# Patient Record
Sex: FEMALE | URBAN - METROPOLITAN AREA
[De-identification: names, ages, dates, MRNs, and addresses within clinical notes are randomized per-mention and may not be internally consistent; named-entity substitution may affect disease eponyms.]

---

## 2018-08-21 ENCOUNTER — OFFICE VISIT (OUTPATIENT)
Dept: URBAN - METROPOLITAN AREA CLINIC 23 | Facility: CLINIC | Age: 78
End: 2018-08-21
Payer: COMMERCIAL

## 2018-08-21 DIAGNOSIS — B02.9 ZOSTER W/O COMPLICATION: Primary | ICD-10-CM

## 2018-08-21 DIAGNOSIS — H25.813 COMBINED FORMS OF AGE-RELATED CATARACT, BILATERAL: ICD-10-CM

## 2018-08-21 PROCEDURE — 92012 INTRM OPH EXAM EST PATIENT: CPT | Performed by: OPTOMETRIST

## 2018-08-21 ASSESSMENT — INTRAOCULAR PRESSURE
OD: 12
OS: 12

## 2018-08-21 NOTE — IMPRESSION/PLAN
Impression: Zoster w/o complication: O93.1. Plan: Discussed condition with pt. Advised her to f/u with PCP. Told pt to wait about a month before getting shingles shot. Recommended  also get the shot.

## 2018-08-21 NOTE — IMPRESSION/PLAN
Impression: Combined forms of age-related cataract, bilateral: H25.813. Plan: Cataracts account for the patient's complaints. Patient understands changing glasses will not improve vision. Patient desires to have surgery, recommend surgical consult. Briefly discussed premium IOL. Pt may be good candidate for multifocal lens.

## 2018-09-25 ENCOUNTER — OFFICE VISIT (OUTPATIENT)
Dept: URBAN - METROPOLITAN AREA CLINIC 23 | Facility: CLINIC | Age: 78
End: 2018-09-25
Payer: COMMERCIAL

## 2018-09-25 PROCEDURE — 99214 OFFICE O/P EST MOD 30 MIN: CPT | Performed by: OPHTHALMOLOGY

## 2018-09-25 RX ORDER — PREDNISOLONE ACETATE 10 MG/ML
1 % SUSPENSION/ DROPS OPHTHALMIC
Qty: 1 | Refills: 1 | Status: INACTIVE
Start: 2018-09-25 | End: 2020-01-15

## 2018-09-25 RX ORDER — OFLOXACIN 3 MG/ML
0.3 % SOLUTION/ DROPS OPHTHALMIC
Qty: 1 | Refills: 1 | Status: INACTIVE
Start: 2018-09-25 | End: 2020-01-15

## 2018-09-25 ASSESSMENT — VISUAL ACUITY
OD: 20/40
OS: 20/25

## 2018-09-25 ASSESSMENT — INTRAOCULAR PRESSURE
OD: 13
OS: 12

## 2018-09-25 NOTE — IMPRESSION/PLAN
Impression: Combined forms of age-related cataract, bilateral: H25.813. Condition: established, worsening. Symptoms: may improve with surgery. Vision: vision affected. OD>OS Plan: Cataract accounts for patient's complaints. Reviewed risks, benefits, and procedure. Patient desires surgery, schedule ce/iol OD, RL2, discussed lens options, distance refractive target, patient is clear for surgery in [place] [address].

## 2018-10-02 ENCOUNTER — PRE-OPERATIVE VISIT (OUTPATIENT)
Dept: URBAN - METROPOLITAN AREA CLINIC 23 | Facility: CLINIC | Age: 78
End: 2018-10-02
Payer: COMMERCIAL

## 2018-10-02 PROCEDURE — 92025 CPTRIZED CORNEAL TOPOGRAPHY: CPT | Performed by: OPHTHALMOLOGY

## 2018-10-02 PROCEDURE — 92136 OPHTHALMIC BIOMETRY: CPT | Performed by: OPHTHALMOLOGY

## 2018-10-02 ASSESSMENT — PACHYMETRY
OS: 21.92
OD: 21.98
OS: 2.74
OD: 2.71

## 2018-10-19 ENCOUNTER — SURGERY (OUTPATIENT)
Dept: URBAN - METROPOLITAN AREA SURGERY 11 | Facility: SURGERY | Age: 78
End: 2018-10-19
Payer: COMMERCIAL

## 2018-10-19 PROCEDURE — 66984 XCAPSL CTRC RMVL W/O ECP: CPT | Performed by: OPHTHALMOLOGY

## 2018-10-20 ENCOUNTER — POST-OPERATIVE VISIT (OUTPATIENT)
Dept: URBAN - METROPOLITAN AREA CLINIC 23 | Facility: CLINIC | Age: 78
End: 2018-10-20

## 2018-10-20 ASSESSMENT — INTRAOCULAR PRESSURE
OD: 21
OS: 13

## 2018-10-25 ENCOUNTER — POST-OPERATIVE VISIT (OUTPATIENT)
Dept: URBAN - METROPOLITAN AREA CLINIC 23 | Facility: CLINIC | Age: 78
End: 2018-10-25

## 2018-10-25 PROCEDURE — 99024 POSTOP FOLLOW-UP VISIT: CPT | Performed by: OPTOMETRIST

## 2018-10-25 ASSESSMENT — INTRAOCULAR PRESSURE
OS: 14
OD: 10

## 2018-10-25 ASSESSMENT — VISUAL ACUITY
OS: 20/25
OD: 20/25

## 2018-11-13 ENCOUNTER — POST-OPERATIVE VISIT (OUTPATIENT)
Dept: URBAN - METROPOLITAN AREA CLINIC 23 | Facility: CLINIC | Age: 78
End: 2018-11-13

## 2018-11-13 DIAGNOSIS — Z09 ENCNTR FOR F/U EXAM AFT TRTMT FOR COND OTH THAN MALIG NEOPLM: Primary | ICD-10-CM

## 2018-11-13 ASSESSMENT — INTRAOCULAR PRESSURE
OS: 12
OD: 12

## 2018-11-28 ENCOUNTER — OFFICE VISIT (OUTPATIENT)
Dept: URBAN - METROPOLITAN AREA CLINIC 23 | Facility: CLINIC | Age: 78
End: 2018-11-28
Payer: COMMERCIAL

## 2018-11-28 DIAGNOSIS — H52.4 PRESBYOPIA: Primary | ICD-10-CM

## 2018-11-28 PROCEDURE — 92015 DETERMINE REFRACTIVE STATE: CPT | Performed by: OPTOMETRIST

## 2018-11-28 PROCEDURE — 92012 INTRM OPH EXAM EST PATIENT: CPT | Performed by: OPTOMETRIST

## 2018-11-28 ASSESSMENT — INTRAOCULAR PRESSURE
OD: 12
OS: 12

## 2018-11-28 ASSESSMENT — VISUAL ACUITY
OD: 20/20
OS: 20/25

## 2020-01-15 ENCOUNTER — OFFICE VISIT (OUTPATIENT)
Dept: URBAN - METROPOLITAN AREA CLINIC 23 | Facility: CLINIC | Age: 80
End: 2020-01-15
Payer: COMMERCIAL

## 2020-01-15 PROCEDURE — 92134 CPTRZ OPH DX IMG PST SGM RTA: CPT | Performed by: OPTOMETRIST

## 2020-01-15 PROCEDURE — 92014 COMPRE OPH EXAM EST PT 1/>: CPT | Performed by: OPTOMETRIST

## 2020-01-15 ASSESSMENT — KERATOMETRY
OS: 45.75
OD: 46.13

## 2020-01-15 ASSESSMENT — INTRAOCULAR PRESSURE
OS: 12
OD: 12

## 2020-01-15 NOTE — IMPRESSION/PLAN
Impression: Type 2 diabetes mellitus without complications: K92.1. Plan: Discussed diagnosis in detail with patient. Advised and emphasized patient of blood sugar control. Discussed risks of progression. Poor compliance can lead to blindness. OCT macula performed and reviewed with patient today- no signs of diabetic retinopathy. Reassured patient of condition and treatment. Will continue to observe condition and or symptoms.

## 2020-01-15 NOTE — IMPRESSION/PLAN
Impression: Combined forms of age-related cataract, left eye: H25.812. Plan: Discussed diagnosis in detail with patient. Cataracts affecting vision some, but no surgery is currently recommended. The patient will monitor vision changes and contact us with any decrease in vision. Continue to monitor.

## 2020-07-15 ENCOUNTER — OFFICE VISIT (OUTPATIENT)
Dept: URBAN - METROPOLITAN AREA CLINIC 23 | Facility: CLINIC | Age: 80
End: 2020-07-15
Payer: COMMERCIAL

## 2020-07-15 DIAGNOSIS — D36.9 BENIGN NEOPLASM, UNSPECIFIED SITE: ICD-10-CM

## 2020-07-15 DIAGNOSIS — H16.9 KERATITIS: ICD-10-CM

## 2020-07-15 DIAGNOSIS — H25.812 COMBINED FORMS OF AGE-RELATED CATARACT, LEFT EYE: ICD-10-CM

## 2020-07-15 PROCEDURE — 92014 COMPRE OPH EXAM EST PT 1/>: CPT | Performed by: OPTOMETRIST

## 2020-07-15 ASSESSMENT — VISUAL ACUITY
OS: 20/25
OD: 20/20

## 2020-07-15 ASSESSMENT — INTRAOCULAR PRESSURE
OD: 14
OS: 14

## 2020-07-15 ASSESSMENT — KERATOMETRY
OD: 46.38
OS: 46.25

## 2020-07-15 NOTE — IMPRESSION/PLAN
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 Plan: Discussed diagnosis in detail with patient. Discussed treatment options with patient. Reassured patient of current condition and treatment. Will continue to observe condition and or symptoms.  Consult recommended [[facility]] consult for cyst in canthus OD

## 2020-07-15 NOTE — IMPRESSION/PLAN
Impression: Type 2 diabetes mellitus without complications: B07.8. Plan: Discussed diagnosis in detail with patient. Advised and emphasized patient of blood sugar control. Discussed risks of progression. Poor compliance can lead to blindness. No signs of diabetic retinopathy. Reassured patient of condition and treatment. Will continue to observe condition and or symptoms.

## 2021-09-02 ENCOUNTER — OFFICE VISIT (OUTPATIENT)
Dept: URBAN - METROPOLITAN AREA CLINIC 23 | Facility: CLINIC | Age: 81
End: 2021-09-02
Payer: COMMERCIAL

## 2021-09-02 DIAGNOSIS — E11.9 TYPE 2 DIABETES MELLITUS W/O COMPLICATION: Primary | ICD-10-CM

## 2021-09-02 PROCEDURE — 99214 OFFICE O/P EST MOD 30 MIN: CPT | Performed by: OPTOMETRIST

## 2021-09-02 ASSESSMENT — VISUAL ACUITY
OS: 20/40
OD: 20/20

## 2021-09-02 ASSESSMENT — KERATOMETRY
OD: 45.63
OS: 45.88

## 2021-09-02 ASSESSMENT — INTRAOCULAR PRESSURE
OS: 10
OD: 12

## 2021-09-02 NOTE — IMPRESSION/PLAN
Impression: Combined forms of age-related cataract, left eye: H25.812. Plan: Discussed findings with pt. Pt desires to proceed with cataract surgery. Recommend getting distance standard lens, pt agrees.

## 2021-09-02 NOTE — IMPRESSION/PLAN
Impression: Type 2 diabetes mellitus w/o complication: Y55.6. Plan: Ordered and reviewed special tests and/or Optos and/or OCT and/or Visual Field. Optos shows no background diabetic retinopathy. Pt will continue to watch bloos sugar levels.

## 2021-09-03 ENCOUNTER — OFFICE VISIT (OUTPATIENT)
Dept: URBAN - METROPOLITAN AREA CLINIC 23 | Facility: CLINIC | Age: 81
End: 2021-09-03
Payer: COMMERCIAL

## 2021-09-03 PROCEDURE — 99214 OFFICE O/P EST MOD 30 MIN: CPT | Performed by: OPHTHALMOLOGY

## 2021-09-03 ASSESSMENT — VISUAL ACUITY
OD: 20/20
OS: 20/40

## 2021-09-03 ASSESSMENT — INTRAOCULAR PRESSURE
OS: 9
OD: 9

## 2021-09-03 NOTE — IMPRESSION/PLAN
Impression: Combined forms of age-related cataract, left eye: H25.812. Condition: established, worsening. Symptoms: could improve with surgery. Plan: Cataract accounts for patient's complaints. Reviewed risks, benefits, and procedure. Patient desires surgery, schedule ce/iol OS, RL2, standard lens, distance refractive target, patient is clear for surgery in [facility].

## 2021-10-08 ENCOUNTER — TESTING ONLY (OUTPATIENT)
Dept: URBAN - METROPOLITAN AREA CLINIC 23 | Facility: CLINIC | Age: 81
End: 2021-10-08
Payer: COMMERCIAL

## 2021-10-08 ASSESSMENT — PACHYMETRY
OS: 2.69
OD: 4.22
OS: 21.93
OD: 21.97

## 2021-10-19 ENCOUNTER — SURGERY (OUTPATIENT)
Dept: URBAN - METROPOLITAN AREA SURGERY 11 | Facility: SURGERY | Age: 81
End: 2021-10-19
Payer: COMMERCIAL

## 2021-10-19 PROCEDURE — 66984 XCAPSL CTRC RMVL W/O ECP: CPT | Performed by: OPHTHALMOLOGY

## 2021-10-20 ENCOUNTER — POST-OPERATIVE VISIT (OUTPATIENT)
Dept: URBAN - METROPOLITAN AREA CLINIC 23 | Facility: CLINIC | Age: 81
End: 2021-10-20

## 2021-10-20 PROCEDURE — 99024 POSTOP FOLLOW-UP VISIT: CPT | Performed by: OPTOMETRIST

## 2021-10-20 ASSESSMENT — INTRAOCULAR PRESSURE
OS: 18
OD: 10

## 2021-10-20 NOTE — IMPRESSION/PLAN
Impression: S/P Phaco - PC IOL SA60WF +24.00 OS - 1 Day. Presence of intraocular lens  Z96.1. Post operative instructions reviewed - Plan: Return as scheduled. Sooner if sudden vision change or increasing pain. Discussed pt. may see floater/Dexycu. --Advised patient to use artificial tears for comfort.

## 2021-10-27 ENCOUNTER — POST-OPERATIVE VISIT (OUTPATIENT)
Dept: URBAN - METROPOLITAN AREA CLINIC 23 | Facility: CLINIC | Age: 81
End: 2021-10-27

## 2021-10-27 DIAGNOSIS — Z96.1 PRESENCE OF INTRAOCULAR LENS: Primary | ICD-10-CM

## 2021-10-27 PROCEDURE — 99024 POSTOP FOLLOW-UP VISIT: CPT | Performed by: OPTOMETRIST

## 2021-10-27 NOTE — IMPRESSION/PLAN
Impression: S/P Phaco - PC IOL SA60WF +24.00 OS - 8 Days. Presence of intraocular lens  Z96.1. Condition is improving - Plan: --Advised patient to use artificial tears for comfort.

## 2021-12-01 ENCOUNTER — OFFICE VISIT (OUTPATIENT)
Dept: URBAN - METROPOLITAN AREA CLINIC 23 | Facility: CLINIC | Age: 81
End: 2021-12-01
Payer: COMMERCIAL

## 2021-12-01 PROCEDURE — 92012 INTRM OPH EXAM EST PATIENT: CPT | Performed by: OPTOMETRIST

## 2021-12-01 ASSESSMENT — INTRAOCULAR PRESSURE
OS: 6
OD: 11

## 2021-12-01 ASSESSMENT — VISUAL ACUITY
OD: 20/25
OS: 20/40

## 2021-12-01 NOTE — IMPRESSION/PLAN
Impression: Presbyopia: H52.4 Bilateral. Condition: mild. Plan: Discussed findings. New Rx given. Recommend artificial tears to bring down dryness seen on cornea.

## 2023-01-18 ENCOUNTER — OFFICE VISIT (OUTPATIENT)
Dept: URBAN - METROPOLITAN AREA CLINIC 23 | Facility: CLINIC | Age: 83
End: 2023-01-18
Payer: COMMERCIAL

## 2023-01-18 DIAGNOSIS — E11.9 TYPE 2 DIABETES MELLITUS WITHOUT COMPLICATIONS: Primary | ICD-10-CM

## 2023-01-18 PROCEDURE — 99213 OFFICE O/P EST LOW 20 MIN: CPT | Performed by: OPTOMETRIST

## 2023-01-18 ASSESSMENT — INTRAOCULAR PRESSURE
OD: 15
OS: 14

## 2023-01-18 ASSESSMENT — KERATOMETRY
OS: 43.88
OD: 46.50

## 2023-01-18 NOTE — IMPRESSION/PLAN
Impression: Type 2 diabetes mellitus without complications: W91.9. Bilateral. Condition: established, stable. Plan: Discussed findings. OPTOS photos ordered and reviewed with patient today. No signs of retinopathy or neovascularization noted today. Continue blood sugar control and f/u with PCP, monitor yearly.

## 2024-01-18 ENCOUNTER — OFFICE VISIT (OUTPATIENT)
Dept: URBAN - METROPOLITAN AREA CLINIC 23 | Facility: CLINIC | Age: 84
End: 2024-01-18
Payer: COMMERCIAL

## 2024-01-18 DIAGNOSIS — E11.9 TYPE 2 DIABETES MELLITUS WITHOUT COMPLICATIONS: Primary | ICD-10-CM

## 2024-01-18 PROCEDURE — 99213 OFFICE O/P EST LOW 20 MIN: CPT | Performed by: OPTOMETRIST

## 2024-01-18 ASSESSMENT — INTRAOCULAR PRESSURE
OD: 12
OS: 11

## 2025-01-20 ENCOUNTER — OFFICE VISIT (OUTPATIENT)
Dept: URBAN - METROPOLITAN AREA CLINIC 23 | Facility: CLINIC | Age: 85
End: 2025-01-20
Payer: COMMERCIAL

## 2025-01-20 DIAGNOSIS — H52.4 PRESBYOPIA: ICD-10-CM

## 2025-01-20 DIAGNOSIS — Z96.1 PRESENCE OF INTRAOCULAR LENS: ICD-10-CM

## 2025-01-20 DIAGNOSIS — E11.9 TYPE 2 DIABETES MELLITUS W/O COMPLICATION: Primary | ICD-10-CM

## 2025-01-20 DIAGNOSIS — H04.123 DRY EYE SYNDROME OF BILATERAL LACRIMAL GLANDS: ICD-10-CM

## 2025-01-20 DIAGNOSIS — H43.813 VITREOUS DEGENERATION, BILATERAL: ICD-10-CM

## 2025-01-20 PROCEDURE — 99213 OFFICE O/P EST LOW 20 MIN: CPT

## 2025-01-20 ASSESSMENT — INTRAOCULAR PRESSURE
OD: 12
OS: 12

## 2025-01-20 ASSESSMENT — KERATOMETRY
OS: 45.13
OD: 45.88